# Patient Record
Sex: MALE | Race: WHITE | NOT HISPANIC OR LATINO | Employment: UNEMPLOYED | URBAN - NONMETROPOLITAN AREA
[De-identification: names, ages, dates, MRNs, and addresses within clinical notes are randomized per-mention and may not be internally consistent; named-entity substitution may affect disease eponyms.]

---

## 2021-07-16 ENCOUNTER — APPOINTMENT (OUTPATIENT)
Dept: GENERAL RADIOLOGY | Facility: HOSPITAL | Age: 31
End: 2021-07-16

## 2021-07-16 ENCOUNTER — HOSPITAL ENCOUNTER (EMERGENCY)
Facility: HOSPITAL | Age: 31
Discharge: HOME | End: 2021-07-16
Attending: EMERGENCY MEDICINE | Admitting: EMERGENCY MEDICINE

## 2021-07-16 ENCOUNTER — APPOINTMENT (OUTPATIENT)
Dept: CT IMAGING | Facility: HOSPITAL | Age: 31
End: 2021-07-16

## 2021-07-16 VITALS
DIASTOLIC BLOOD PRESSURE: 95 MMHG | RESPIRATION RATE: 18 BRPM | BODY MASS INDEX: 24.62 KG/M2 | TEMPERATURE: 97.9 F | HEIGHT: 75 IN | HEART RATE: 80 BPM | OXYGEN SATURATION: 99 % | WEIGHT: 198 LBS | SYSTOLIC BLOOD PRESSURE: 142 MMHG

## 2021-07-16 DIAGNOSIS — I10 HYPERTENSION, UNSPECIFIED TYPE: Primary | ICD-10-CM

## 2021-07-16 LAB
ALBUMIN SERPL-MCNC: 4.44 G/DL (ref 3.5–5.2)
ALBUMIN/GLOB SERPL: 1.6 G/DL
ALP SERPL-CCNC: 110 U/L (ref 39–117)
ALT SERPL W P-5'-P-CCNC: 213 U/L (ref 1–41)
ANION GAP SERPL CALCULATED.3IONS-SCNC: 8.6 MMOL/L (ref 5–15)
AST SERPL-CCNC: 87 U/L (ref 1–40)
BASOPHILS # BLD AUTO: 0.05 10*3/MM3 (ref 0–0.2)
BASOPHILS NFR BLD AUTO: 0.7 % (ref 0–1.5)
BILIRUB SERPL-MCNC: 0.3 MG/DL (ref 0–1.2)
BILIRUB UR QL STRIP: NEGATIVE
BUN SERPL-MCNC: 8 MG/DL (ref 6–20)
BUN/CREAT SERPL: 8.8 (ref 7–25)
CALCIUM SPEC-SCNC: 9.6 MG/DL (ref 8.6–10.5)
CHLORIDE SERPL-SCNC: 102 MMOL/L (ref 98–107)
CLARITY UR: CLEAR
CO2 SERPL-SCNC: 30.4 MMOL/L (ref 22–29)
COLOR UR: YELLOW
CREAT SERPL-MCNC: 0.91 MG/DL (ref 0.76–1.27)
DEPRECATED RDW RBC AUTO: 43.9 FL (ref 37–54)
EOSINOPHIL # BLD AUTO: 0.17 10*3/MM3 (ref 0–0.4)
EOSINOPHIL NFR BLD AUTO: 2.2 % (ref 0.3–6.2)
ERYTHROCYTE [DISTWIDTH] IN BLOOD BY AUTOMATED COUNT: 12.7 % (ref 12.3–15.4)
GFR SERPL CREATININE-BSD FRML MDRD: 99 ML/MIN/1.73
GLOBULIN UR ELPH-MCNC: 2.8 GM/DL
GLUCOSE SERPL-MCNC: 104 MG/DL (ref 65–99)
GLUCOSE UR STRIP-MCNC: NEGATIVE MG/DL
HCT VFR BLD AUTO: 47.8 % (ref 37.5–51)
HGB BLD-MCNC: 15.8 G/DL (ref 13–17.7)
HGB UR QL STRIP.AUTO: NEGATIVE
HOLD SPECIMEN: NORMAL
HOLD SPECIMEN: NORMAL
IMM GRANULOCYTES # BLD AUTO: 0.05 10*3/MM3 (ref 0–0.05)
IMM GRANULOCYTES NFR BLD AUTO: 0.7 % (ref 0–0.5)
KETONES UR QL STRIP: NEGATIVE
LEUKOCYTE ESTERASE UR QL STRIP.AUTO: NEGATIVE
LYMPHOCYTES # BLD AUTO: 1.94 10*3/MM3 (ref 0.7–3.1)
LYMPHOCYTES NFR BLD AUTO: 25.5 % (ref 19.6–45.3)
MAGNESIUM SERPL-MCNC: 1.9 MG/DL (ref 1.6–2.6)
MCH RBC QN AUTO: 30.9 PG (ref 26.6–33)
MCHC RBC AUTO-ENTMCNC: 33.1 G/DL (ref 31.5–35.7)
MCV RBC AUTO: 93.4 FL (ref 79–97)
MONOCYTES # BLD AUTO: 0.88 10*3/MM3 (ref 0.1–0.9)
MONOCYTES NFR BLD AUTO: 11.6 % (ref 5–12)
NEUTROPHILS NFR BLD AUTO: 4.51 10*3/MM3 (ref 1.7–7)
NEUTROPHILS NFR BLD AUTO: 59.3 % (ref 42.7–76)
NITRITE UR QL STRIP: NEGATIVE
NRBC BLD AUTO-RTO: 0 /100 WBC (ref 0–0.2)
PH UR STRIP.AUTO: 7.5 [PH] (ref 5–8)
PLATELET # BLD AUTO: 162 10*3/MM3 (ref 140–450)
PMV BLD AUTO: 11.2 FL (ref 6–12)
POTASSIUM SERPL-SCNC: 4.3 MMOL/L (ref 3.5–5.2)
PROT SERPL-MCNC: 7.2 G/DL (ref 6–8.5)
PROT UR QL STRIP: NEGATIVE
RBC # BLD AUTO: 5.12 10*6/MM3 (ref 4.14–5.8)
SODIUM SERPL-SCNC: 141 MMOL/L (ref 136–145)
SP GR UR STRIP: 1.01 (ref 1–1.03)
TROPONIN T SERPL-MCNC: <0.01 NG/ML (ref 0–0.03)
TSH SERPL DL<=0.05 MIU/L-ACNC: 0.87 UIU/ML (ref 0.27–4.2)
UROBILINOGEN UR QL STRIP: NORMAL
WBC # BLD AUTO: 7.6 10*3/MM3 (ref 3.4–10.8)
WHOLE BLOOD HOLD SPECIMEN: NORMAL

## 2021-07-16 PROCEDURE — 70450 CT HEAD/BRAIN W/O DYE: CPT

## 2021-07-16 PROCEDURE — 81003 URINALYSIS AUTO W/O SCOPE: CPT | Performed by: PHYSICIAN ASSISTANT

## 2021-07-16 PROCEDURE — 93005 ELECTROCARDIOGRAM TRACING: CPT | Performed by: PHYSICIAN ASSISTANT

## 2021-07-16 PROCEDURE — 71045 X-RAY EXAM CHEST 1 VIEW: CPT

## 2021-07-16 PROCEDURE — 84484 ASSAY OF TROPONIN QUANT: CPT | Performed by: PHYSICIAN ASSISTANT

## 2021-07-16 PROCEDURE — 93010 ELECTROCARDIOGRAM REPORT: CPT | Performed by: SPECIALIST

## 2021-07-16 PROCEDURE — 99283 EMERGENCY DEPT VISIT LOW MDM: CPT

## 2021-07-16 PROCEDURE — 71045 X-RAY EXAM CHEST 1 VIEW: CPT | Performed by: RADIOLOGY

## 2021-07-16 PROCEDURE — 84443 ASSAY THYROID STIM HORMONE: CPT | Performed by: PHYSICIAN ASSISTANT

## 2021-07-16 PROCEDURE — 80053 COMPREHEN METABOLIC PANEL: CPT | Performed by: PHYSICIAN ASSISTANT

## 2021-07-16 PROCEDURE — 85025 COMPLETE CBC W/AUTO DIFF WBC: CPT | Performed by: PHYSICIAN ASSISTANT

## 2021-07-16 PROCEDURE — 70450 CT HEAD/BRAIN W/O DYE: CPT | Performed by: RADIOLOGY

## 2021-07-16 PROCEDURE — 83735 ASSAY OF MAGNESIUM: CPT | Performed by: PHYSICIAN ASSISTANT

## 2021-07-16 RX ORDER — SODIUM CHLORIDE 0.9 % (FLUSH) 0.9 %
10 SYRINGE (ML) INJECTION AS NEEDED
Status: DISCONTINUED | OUTPATIENT
Start: 2021-07-16 | End: 2021-07-16 | Stop reason: HOSPADM

## 2021-07-16 RX ORDER — HYDROCHLOROTHIAZIDE 12.5 MG/1
12.5 TABLET ORAL DAILY
Qty: 30 TABLET | Refills: 0 | Status: SHIPPED | OUTPATIENT
Start: 2021-07-16

## 2021-07-16 RX ORDER — CLONIDINE HYDROCHLORIDE 0.1 MG/1
0.1 TABLET ORAL ONCE
Status: COMPLETED | OUTPATIENT
Start: 2021-07-16 | End: 2021-07-16

## 2021-07-16 RX ADMIN — CLONIDINE HYDROCHLORIDE 0.1 MG: 0.1 TABLET ORAL at 14:31

## 2021-07-16 NOTE — ED PROVIDER NOTES
Subjective   30-year-old male who presents to the ED today for elevated blood pressure.  He states he has noticed that his blood pressure has been elevated for the last few days.  He states he is not currently on any blood pressure medication and he does not have a primary care provider.  He states he was diagnosed with hypertension while he was in nursing home.  He states that plan to start him on medicine for this but then he was released.  He states today his blood pressure was 152/108.  He denies any chest pain.  He states he does feel mildly short of breath and has a headache.  He denies any change in his vision.  He denies any nausea or vomiting.  He denies any urinary symptoms.  He states that he does drink a lot of caffeine.  He states he has also been under a lot of stress recently.  He does smoke half a pack of cigarettes per day.      History provided by:  Patient  Hypertension  Severity:  Moderate  Onset quality:  Gradual  Duration: a few days.  Timing:  Constant  Progression:  Worsening  Chronicity:  New  Notable PTA blood pressures:  152/108  Context: caffeine and stress    Context: not drug abuse    Relieved by:  Nothing  Worsened by:  Nothing  Associated symptoms: anxiety, headaches and shortness of breath    Associated symptoms: no abdominal pain, no blurred vision, no chest pain, no confusion, no dizziness, no ear pain, no epistaxis, no fatigue, no fever, no hematuria, no nausea, no neck pain, no palpitations, no peripheral edema, no syncope, no tinnitus, not vomiting and no weakness    Risk factors: tobacco use    Risk factors: no alcohol use, no cocaine use, no decongestant use, no diabetes and no obesity        Review of Systems   Constitutional: Negative.  Negative for fatigue and fever.   HENT: Negative for ear pain, nosebleeds and tinnitus.    Eyes: Negative.  Negative for blurred vision and visual disturbance.   Respiratory: Positive for shortness of breath. Negative for chest tightness.     Cardiovascular: Negative for chest pain, palpitations, leg swelling and syncope.   Gastrointestinal: Negative for abdominal pain, nausea and vomiting.   Genitourinary: Negative.  Negative for hematuria.   Musculoskeletal: Negative for neck pain.   Skin: Negative.    Neurological: Positive for headaches. Negative for dizziness and weakness.   Psychiatric/Behavioral: Negative for confusion. The patient is nervous/anxious.    All other systems reviewed and are negative.      Past Medical History:   Diagnosis Date   • Hypertension        Allergies   Allergen Reactions   • Suprax [Cefixime] Anaphylaxis       Past Surgical History:   Procedure Laterality Date   • BACK SURGERY     • CHEST TUBE INSERTION     • HAND SURGERY     • TONSILLECTOMY         No family history on file.    Social History     Socioeconomic History   • Marital status: Single     Spouse name: Not on file   • Number of children: Not on file   • Years of education: Not on file   • Highest education level: Not on file   Tobacco Use   • Smoking status: Current Every Day Smoker     Packs/day: 0.50     Types: Cigarettes   Substance and Sexual Activity   • Alcohol use: No   • Drug use: No   • Sexual activity: Defer           Objective   Physical Exam  Vitals and nursing note reviewed.   Constitutional:       General: He is not in acute distress.     Appearance: Normal appearance. He is normal weight. He is not diaphoretic.   HENT:      Head: Normocephalic and atraumatic.      Right Ear: External ear normal.      Left Ear: External ear normal.   Eyes:      Conjunctiva/sclera: Conjunctivae normal.      Pupils: Pupils are equal, round, and reactive to light.   Cardiovascular:      Rate and Rhythm: Normal rate and regular rhythm.      Pulses: Normal pulses.      Heart sounds: Normal heart sounds.   Pulmonary:      Effort: Pulmonary effort is normal.      Breath sounds: Normal breath sounds.   Chest:      Chest wall: No tenderness.   Abdominal:      General:  Bowel sounds are normal.      Palpations: Abdomen is soft.      Tenderness: There is no abdominal tenderness. There is no guarding or rebound.   Musculoskeletal:         General: Normal range of motion.      Cervical back: Normal range of motion and neck supple. No tenderness.      Right lower leg: No edema.      Left lower leg: No edema.   Lymphadenopathy:      Cervical: No cervical adenopathy.   Skin:     General: Skin is warm and dry.      Capillary Refill: Capillary refill takes less than 2 seconds.   Neurological:      General: No focal deficit present.      Mental Status: He is alert and oriented to person, place, and time.   Psychiatric:         Mood and Affect: Mood normal.         Procedures           ED Course  ED Course as of Jul 16 1644 Fri Jul 16, 2021   2011 FINDINGS:    Brain:  Unremarkable.  No hemorrhage.  No significant white matter  disease.  No edema.    Ventricles:  Unremarkable.  No ventriculomegaly.    Bones/joints:  Unremarkable.  No acute fracture.    Soft tissues:  Unremarkable.    Sinuses:  Unremarkable as visualized.  No acute sinusitis.    Mastoid air cells:  Unremarkable as visualized.  No mastoid effusion.     IMPRESSION:    Unremarkable exam demonstrating no CT evidence of acute intracranial  findings.      CT Head Without Contrast [AH]   7541 FINDINGS:    Lungs:  Unremarkable.  No consolidation.    Pleural space:  Unremarkable.  No pneumothorax.    Heart:  Unremarkable.  No cardiomegaly.    Mediastinum:  Unremarkable.    Bones/joints:  Unremarkable.     IMPRESSION:    Unremarkable exam. No acute cardiopulmonary findings identified.   XR Chest 1 View [AH]   1596 Patient's blood pressure improved and he reports he is feeling better.  He will be discharged home on hydrochlorothiazide 12.5 mg/day.  He was advised on the importance of becoming established with a primary care provider and to monitor his blood pressure at home.  He will return to the ED if his symptoms change or worsen.     [AH]      ED Course User Index  [AH] Rebeca Hunter PA                                           MDM  Number of Diagnoses or Management Options     Amount and/or Complexity of Data Reviewed  Clinical lab tests: reviewed  Tests in the radiology section of CPT®: reviewed  Tests in the medicine section of CPT®: reviewed    Patient Progress  Patient progress: improved      Final diagnoses:   Hypertension, unspecified type       ED Disposition  ED Disposition     ED Disposition Condition Comment    Discharge Stable           PATIENT CONNECTION - [place]  See Provider List  [facility] [address]  [phone]  Schedule an appointment as soon as possible for a visit in 1 week           Medication List      New Prescriptions    hydroCHLOROthiazide 12.5 MG tablet  Commonly known as: HYDRODIURIL  Take 1 tablet by mouth Daily.           Where to Get Your Medications      These medications were sent to [facility] - [address] [address]    Hours: 8AM-6PM Mon-Fri Phone: [phone]   · hydroCHLOROthiazide 12.5 MG tablet          Rebeca Hunter PA  07/16/21 8201

## 2021-07-16 NOTE — DISCHARGE INSTRUCTIONS
Call the number below to become established with a primary care provider.  Follow your blood pressures at home and keep a log to show to your primary care provider.

## 2021-07-17 LAB
QT INTERVAL: 364 MS
QTC INTERVAL: 440 MS

## 2021-09-09 ENCOUNTER — TRANSCRIBE ORDERS (OUTPATIENT)
Dept: ADMINISTRATIVE | Facility: HOSPITAL | Age: 31
End: 2021-09-09

## 2021-09-09 ENCOUNTER — HOSPITAL ENCOUNTER (OUTPATIENT)
Dept: GENERAL RADIOLOGY | Facility: HOSPITAL | Age: 31
Discharge: HOME | End: 2021-09-09

## 2021-09-09 DIAGNOSIS — R10.9 ABDOMINAL PAIN, UNSPECIFIED ABDOMINAL LOCATION: ICD-10-CM

## 2021-09-09 DIAGNOSIS — R10.9 ABDOMINAL PAIN, UNSPECIFIED ABDOMINAL LOCATION: Primary | ICD-10-CM

## 2021-09-09 PROCEDURE — 74018 RADEX ABDOMEN 1 VIEW: CPT | Performed by: RADIOLOGY

## 2021-09-09 PROCEDURE — 74018 RADEX ABDOMEN 1 VIEW: CPT

## 2021-09-22 ENCOUNTER — DISEASE STATE MANAGEMENT VISIT (OUTPATIENT)
Dept: PHARMACY | Facility: HOSPITAL | Age: 31
End: 2021-09-22

## 2021-09-22 VITALS
SYSTOLIC BLOOD PRESSURE: 134 MMHG | DIASTOLIC BLOOD PRESSURE: 85 MMHG | WEIGHT: 224 LBS | HEIGHT: 75 IN | HEART RATE: 84 BPM | BODY MASS INDEX: 27.85 KG/M2

## 2021-09-22 DIAGNOSIS — B18.2 CHRONIC HEPATITIS C WITHOUT HEPATIC COMA (HCC): Primary | ICD-10-CM

## 2021-09-22 LAB
AMPHET+METHAMPHET UR QL: NEGATIVE
AMPHETAMINES UR QL: NEGATIVE
BARBITURATES UR QL SCN: NEGATIVE
BENZODIAZ UR QL SCN: NEGATIVE
BUPRENORPHINE SERPL-MCNC: NEGATIVE NG/ML
CANNABINOIDS SERPL QL: NEGATIVE
COCAINE UR QL: NEGATIVE
INR PPP: 0.92 (ref 0.9–1.1)
METHADONE UR QL SCN: NEGATIVE
OPIATES UR QL: NEGATIVE
OXYCODONE UR QL SCN: NEGATIVE
PCP UR QL SCN: NEGATIVE
PROPOXYPH UR QL: NEGATIVE
PROTHROMBIN TIME: 12.7 SECONDS (ref 12.8–14.5)
TRICYCLICS UR QL SCN: NEGATIVE

## 2021-09-22 PROCEDURE — 85025 COMPLETE CBC W/AUTO DIFF WBC: CPT

## 2021-09-22 PROCEDURE — 86704 HEP B CORE ANTIBODY TOTAL: CPT

## 2021-09-22 PROCEDURE — 85610 PROTHROMBIN TIME: CPT

## 2021-09-22 PROCEDURE — 80306 DRUG TEST PRSMV INSTRMNT: CPT

## 2021-09-22 PROCEDURE — 87522 HEPATITIS C REVRS TRNSCRPJ: CPT

## 2021-09-22 PROCEDURE — 99204 OFFICE O/P NEW MOD 45 MIN: CPT | Performed by: PHYSICIAN ASSISTANT

## 2021-09-22 PROCEDURE — 86708 HEPATITIS A ANTIBODY: CPT

## 2021-09-22 PROCEDURE — G0432 EIA HIV-1/HIV-2 SCREEN: HCPCS

## 2021-09-22 PROCEDURE — 82105 ALPHA-FETOPROTEIN SERUM: CPT

## 2021-09-22 PROCEDURE — 86706 HEP B SURFACE ANTIBODY: CPT

## 2021-09-22 PROCEDURE — 87340 HEPATITIS B SURFACE AG IA: CPT

## 2021-09-22 PROCEDURE — 81596 NFCT DS CHRNC HCV 6 ASSAYS: CPT

## 2021-09-22 PROCEDURE — 80053 COMPREHEN METABOLIC PANEL: CPT

## 2021-09-22 PROCEDURE — 87902 NFCT AGT GNTYP ALYS HEP C: CPT

## 2021-09-22 RX ORDER — PHENOL 1.4 %
1 AEROSOL, SPRAY (ML) MUCOUS MEMBRANE NIGHTLY
COMMUNITY

## 2021-09-22 RX ORDER — VENLAFAXINE 75 MG/1
75 TABLET ORAL DAILY
COMMUNITY

## 2021-09-22 RX ORDER — TRAZODONE HYDROCHLORIDE 50 MG/1
50 TABLET ORAL NIGHTLY
COMMUNITY

## 2021-09-22 NOTE — PROGRESS NOTES
Chief Complaint   Patient presents with   • Hepatitis C     Darian Hoffman is a 30 y.o. male who presents to the office today at the request of Self Referring for Hepatitis C.    HPI  He found out about having Hepatitis C infection approx 2 years ago. He has not had prior treatment for hepatitis. Reports no known personal history of liver disease including other viral hepatitis. There is no known family history of liver disease or cirrhosis. He admits to previous IVDU and intranasal drug use. He does have nonprofessional tattoos. Denies having previous alcoholism. He does not currently drink alcohol. He denies current illicit drug use including marijuana. No recent liver imaging. He has had recent general labs. He has had previous vaccinations for Hepatitis A but not Hepatitis B.   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 The patient receives support from his father and mother.      Review of Systems   Constitutional: Negative for activity change, appetite change and fatigue.   HENT: Negative for trouble swallowing and voice change.    Respiratory: Negative for cough and choking.    Cardiovascular: Negative for leg swelling.   Gastrointestinal: Negative for abdominal distention, abdominal pain, anal bleeding, blood in stool, constipation, diarrhea, nausea, rectal pain and vomiting.   Endocrine: Negative for polyphagia.   Genitourinary: Negative for flank pain.   Musculoskeletal: Positive for back pain.   Skin: Negative for color change and pallor.   Allergic/Immunologic: Negative for food allergies.   Neurological: Negative for weakness.   Psychiatric/Behavioral: Negative for confusion.       ACTIVE PROBLEMS:   Specialty Problems     None          PAST MEDICAL HISTORY:  Past Medical History:   Diagnosis Date   • Hypertension        SURGICAL HISTORY:  Past Surgical History:   Procedure Laterality Date   • BACK SURGERY     • CHEST TUBE INSERTION     • HAND SURGERY     • TONSILLECTOMY    "      FAMILY HISTORY:  History reviewed. No pertinent family history.    SOCIAL HISTORY:  Social History     Tobacco Use   • Smoking status: Former Smoker     Packs/day: 0.00     Types: Cigarettes   • Smokeless tobacco: Current User     Types: Snuff   Substance Use Topics   • Alcohol use: No       CURRENT MEDICATION:    Current Outpatient Medications:   •  hydroCHLOROthiazide (HYDRODIURIL) 12.5 MG tablet, Take 1 tablet by mouth Daily., Disp: 30 tablet, Rfl: 0  •  Melatonin 10 MG tablet, Take 1 tablet by mouth Every Night., Disp: , Rfl:   •  traZODone (DESYREL) 50 MG tablet, Take 50 mg by mouth Every Night., Disp: , Rfl:   •  venlafaxine (EFFEXOR) 75 MG tablet, Take 75 mg by mouth Daily., Disp: , Rfl:     ALLERGIES:  Suprax [cefixime]    VISIT VITALS:  Blood Pressure 134/85   Pulse 84   Height 190.5 cm (75\")   Weight 102 kg (224 lb)   Body Mass Index 28.00 kg/m²     PHYSICAL EXAMINATION:  Physical Exam  Constitutional:       General: He is not in acute distress.     Appearance: He is well-developed. He is not diaphoretic.   HENT:      Head: Normocephalic and atraumatic.      Right Ear: External ear normal.      Left Ear: External ear normal.      Nose: Nose normal.      Mouth/Throat:      Pharynx: No oropharyngeal exudate.   Eyes:      General: No scleral icterus.        Right eye: No discharge.         Left eye: No discharge.      Conjunctiva/sclera: Conjunctivae normal.      Pupils: Pupils are equal, round, and reactive to light.   Neck:      Thyroid: No thyromegaly.      Vascular: No JVD.      Trachea: No tracheal deviation.   Cardiovascular:      Rate and Rhythm: Normal rate and regular rhythm.      Heart sounds: Normal heart sounds. No murmur heard.   No friction rub. No gallop.    Pulmonary:      Effort: Pulmonary effort is normal. No respiratory distress.      Breath sounds: Normal breath sounds. No stridor. No wheezing or rales.   Chest:      Chest wall: No tenderness.   Abdominal:      General: Bowel " sounds are normal. There is no distension.      Palpations: Abdomen is soft. There is no mass.      Tenderness: There is abdominal tenderness (epigastric). There is no guarding or rebound.      Hernia: No hernia is present.   Genitourinary:     Rectum: Guaiac result negative.   Musculoskeletal:      Cervical back: Normal range of motion and neck supple.   Lymphadenopathy:      Cervical: No cervical adenopathy.   Skin:     General: Skin is warm and dry.      Coloration: Skin is not pale.      Findings: No erythema or rash.      Comments: Diffuse tattooing on UEs   Neurological:      Mental Status: He is alert and oriented to person, place, and time.      Cranial Nerves: No cranial nerve deficit.      Motor: No abnormal muscle tone.      Coordination: Coordination normal.      Deep Tendon Reflexes: Reflexes are normal and symmetric. Reflexes normal.   Psychiatric:         Behavior: Behavior normal.         Thought Content: Thought content normal.         Judgment: Judgment normal.         Assessment/Plan      Diagnosis Plan   1. Chronic hepatitis C without hepatic coma (HCC)  AFP Tumor Marker    CBC & Differential    Comprehensive Metabolic Panel    HCV FibroSURE    HCV RNA By PCR, Qn Rfx Nichelle    Hepatitis A Antibody, Total    US Liver    Urine Drug Screen - Urine, Clean Catch    Protime-INR    HIV-1 & HIV-2 Antibodies    Hepatitis B Surface Antigen    Hepatitis B Surface Antibody    Hepatitis B Core Antibody, Total     The patient will complete initial lab work and liver US in order to begin Hepatitis C treatment.  The patient will return in two weeks to discuss results and begin treatment if warranted.  Return in about 4 weeks (around 10/20/2021) for Recheck.           MARCELO Stark

## 2021-09-23 LAB
ALBUMIN SERPL-MCNC: 4.8 G/DL (ref 3.5–5.2)
ALBUMIN/GLOB SERPL: 1.8 G/DL
ALP SERPL-CCNC: 94 U/L (ref 39–117)
ALPHA-FETOPROTEIN: 2.65 NG/ML (ref 0–8.3)
ALT SERPL W P-5'-P-CCNC: 190 U/L (ref 1–41)
ANION GAP SERPL CALCULATED.3IONS-SCNC: 11.4 MMOL/L (ref 5–15)
AST SERPL-CCNC: 100 U/L (ref 1–40)
BASOPHILS # BLD AUTO: 0.05 10*3/MM3 (ref 0–0.2)
BASOPHILS NFR BLD AUTO: 0.5 % (ref 0–1.5)
BILIRUB SERPL-MCNC: 0.5 MG/DL (ref 0–1.2)
BUN SERPL-MCNC: 16 MG/DL (ref 6–20)
BUN/CREAT SERPL: 16 (ref 7–25)
CALCIUM SPEC-SCNC: 9.8 MG/DL (ref 8.6–10.5)
CHLORIDE SERPL-SCNC: 100 MMOL/L (ref 98–107)
CO2 SERPL-SCNC: 27.6 MMOL/L (ref 22–29)
CREAT SERPL-MCNC: 1 MG/DL (ref 0.76–1.27)
DEPRECATED RDW RBC AUTO: 43 FL (ref 37–54)
EOSINOPHIL # BLD AUTO: 0.17 10*3/MM3 (ref 0–0.4)
EOSINOPHIL NFR BLD AUTO: 1.8 % (ref 0.3–6.2)
ERYTHROCYTE [DISTWIDTH] IN BLOOD BY AUTOMATED COUNT: 12.3 % (ref 12.3–15.4)
GFR SERPL CREATININE-BSD FRML MDRD: 88 ML/MIN/1.73
GLOBULIN UR ELPH-MCNC: 2.7 GM/DL
GLUCOSE SERPL-MCNC: 95 MG/DL (ref 65–99)
HAV AB SER QL: POSITIVE
HBV CORE AB SERPL QL IA: NEGATIVE
HBV SURFACE AB SER RIA-ACNC: NORMAL
HBV SURFACE AG SERPL QL IA: NORMAL
HCT VFR BLD AUTO: 48.6 % (ref 37.5–51)
HGB BLD-MCNC: 16.1 G/DL (ref 13–17.7)
HIV1+2 AB SER QL: NORMAL
IMM GRANULOCYTES # BLD AUTO: 0.04 10*3/MM3 (ref 0–0.05)
IMM GRANULOCYTES NFR BLD AUTO: 0.4 % (ref 0–0.5)
LYMPHOCYTES # BLD AUTO: 2.06 10*3/MM3 (ref 0.7–3.1)
LYMPHOCYTES NFR BLD AUTO: 21.8 % (ref 19.6–45.3)
MCH RBC QN AUTO: 30.7 PG (ref 26.6–33)
MCHC RBC AUTO-ENTMCNC: 33.1 G/DL (ref 31.5–35.7)
MCV RBC AUTO: 92.7 FL (ref 79–97)
MONOCYTES # BLD AUTO: 0.8 10*3/MM3 (ref 0.1–0.9)
MONOCYTES NFR BLD AUTO: 8.5 % (ref 5–12)
NEUTROPHILS NFR BLD AUTO: 6.34 10*3/MM3 (ref 1.7–7)
NEUTROPHILS NFR BLD AUTO: 67 % (ref 42.7–76)
NRBC BLD AUTO-RTO: 0 /100 WBC (ref 0–0.2)
PLATELET # BLD AUTO: 146 10*3/MM3 (ref 140–450)
PMV BLD AUTO: 12.3 FL (ref 6–12)
POTASSIUM SERPL-SCNC: 3.9 MMOL/L (ref 3.5–5.2)
PROT SERPL-MCNC: 7.5 G/DL (ref 6–8.5)
RBC # BLD AUTO: 5.24 10*6/MM3 (ref 4.14–5.8)
SODIUM SERPL-SCNC: 139 MMOL/L (ref 136–145)
WBC # BLD AUTO: 9.46 10*3/MM3 (ref 3.4–10.8)

## 2021-09-24 LAB
HCV GENTYP SERPL NAA+PROBE: 3
HCV GENTYP SERPL NAA+PROBE: NORMAL
HCV RNA SERPL NAA+PROBE-ACNC: NORMAL IU/ML
HCV RNA SERPL NAA+PROBE-ACNC: NORMAL IU/ML
HCV RNA SERPL NAA+PROBE-LOG IU: 7.2 LOG10 IU/ML
LABORATORY COMMENT REPORT: NORMAL
REF LAB TEST REF RANGE: NORMAL

## 2021-09-25 LAB
A2 MACROGLOB SERPL-MCNC: 156 MG/DL (ref 110–276)
ALT SERPL W P-5'-P-CCNC: 215 IU/L (ref 0–55)
APO A-I SERPL-MCNC: 104 MG/DL (ref 101–178)
BILIRUB SERPL-MCNC: 0.5 MG/DL (ref 0–1.2)
FIBROSIS SCORING:: ABNORMAL
FIBROSIS STAGE SERPL QL: ABNORMAL
GGT SERPL-CCNC: 36 IU/L (ref 0–65)
HAPTOGLOB SERPL-MCNC: 16 MG/DL (ref 17–317)
HCV AB SER QL: ABNORMAL
LABORATORY COMMENT REPORT: ABNORMAL
LIVER FIBR SCORE SERPL CALC.FIBROSURE: 0.38 (ref 0–0.21)
NECROINFLAMM ACTIVITY SCORING:: ABNORMAL
NECROINFLAMMATORY ACT GRADE SERPL QL: ABNORMAL
NECROINFLAMMATORY ACT SCORE SERPL: 0.84 (ref 0–0.17)
SERVICE CMNT-IMP: ABNORMAL

## 2021-10-06 ENCOUNTER — DISEASE STATE MANAGEMENT VISIT (OUTPATIENT)
Dept: PHARMACY | Facility: HOSPITAL | Age: 31
End: 2021-10-06

## 2021-10-06 VITALS
DIASTOLIC BLOOD PRESSURE: 90 MMHG | WEIGHT: 220 LBS | HEIGHT: 74 IN | HEART RATE: 83 BPM | SYSTOLIC BLOOD PRESSURE: 138 MMHG | BODY MASS INDEX: 28.23 KG/M2

## 2021-10-06 DIAGNOSIS — B18.2 CHRONIC HEPATITIS C WITHOUT HEPATIC COMA (HCC): Primary | ICD-10-CM

## 2021-10-06 PROCEDURE — 99214 OFFICE O/P EST MOD 30 MIN: CPT | Performed by: PHYSICIAN ASSISTANT

## 2021-10-06 RX ORDER — GLECAPREVIR AND PIBRENTASVIR 40; 100 MG/1; MG/1
3 TABLET, FILM COATED ORAL DAILY
Qty: 84 TABLET | Refills: 1 | Status: SHIPPED | OUTPATIENT
Start: 2021-10-06

## 2021-10-06 RX ORDER — GLECAPREVIR AND PIBRENTASVIR 40; 100 MG/1; MG/1
3 TABLET, FILM COATED ORAL DAILY
Qty: 84 TABLET | Refills: 1
Start: 2021-10-06 | End: 2021-10-06 | Stop reason: SDUPTHER

## 2021-10-06 NOTE — PROGRESS NOTES
Darian Hoffman is a 30 y.o. male seen [facility] for Hepatitis C treatment.      Past Medical History:   Diagnosis Date   • Hypertension      Social History     Socioeconomic History   • Marital status: Single     Spouse name: Not on file   • Number of children: Not on file   • Years of education: Not on file   • Highest education level: Not on file   Tobacco Use   • Smoking status: Former Smoker     Packs/day: 0.00     Types: Cigarettes   • Smokeless tobacco: Current User     Types: Snuff   Vaping Use   • Vaping Use: Former   • Substances: Nicotine   • Devices: Disposable   Substance and Sexual Activity   • Alcohol use: No   • Drug use: Not Currently     Types: Methamphetamines, Marijuana     Comment: Clean for 3 months   • Sexual activity: Not Currently     Partners: Female     Suprax [cefixime]    Current Outpatient Medications:   •  Glecaprevir-Pibrentasvir (Mavyret) 100-40 MG tablet, Take 3 tablets by mouth Daily., Disp: 84 tablet, Rfl: 1  •  hydroCHLOROthiazide (HYDRODIURIL) 12.5 MG tablet, Take 1 tablet by mouth Daily., Disp: 30 tablet, Rfl: 0  •  Melatonin 10 MG tablet, Take 1 tablet by mouth Every Night., Disp: , Rfl:   •  traZODone (DESYREL) 50 MG tablet, Take 50 mg by mouth Every Night., Disp: , Rfl:   •  venlafaxine (EFFEXOR) 75 MG tablet, Take 75 mg by mouth Daily., Disp: , Rfl:     Labs     WBC   Date Value Ref Range Status   09/22/2021 9.46 3.40 - 10.80 10*3/mm3 Final     RBC   Date Value Ref Range Status   09/22/2021 5.24 4.14 - 5.80 10*6/mm3 Final     Hemoglobin   Date Value Ref Range Status   09/22/2021 16.1 13.0 - 17.7 g/dL Final     Hematocrit   Date Value Ref Range Status   09/22/2021 48.6 37.5 - 51.0 % Final     MCV   Date Value Ref Range Status   09/22/2021 92.7 79.0 - 97.0 fL Final     MCH   Date Value Ref Range Status   09/22/2021 30.7 26.6 - 33.0 pg Final     MCHC   Date Value Ref Range Status   09/22/2021 33.1 31.5 - 35.7 g/dL Final     RDW   Date Value Ref  Range Status   09/22/2021 12.3 12.3 - 15.4 % Final     RDW-SD   Date Value Ref Range Status   09/22/2021 43.0 37.0 - 54.0 fl Final     MPV   Date Value Ref Range Status   09/22/2021 12.3 (H) 6.0 - 12.0 fL Final     Platelets   Date Value Ref Range Status   09/22/2021 146 140 - 450 10*3/mm3 Final     Neutrophil %   Date Value Ref Range Status   09/22/2021 67.0 42.7 - 76.0 % Final     Lymphocyte %   Date Value Ref Range Status   09/22/2021 21.8 19.6 - 45.3 % Final     Monocyte %   Date Value Ref Range Status   09/22/2021 8.5 5.0 - 12.0 % Final     Eosinophil %   Date Value Ref Range Status   09/22/2021 1.8 0.3 - 6.2 % Final     Basophil %   Date Value Ref Range Status   09/22/2021 0.5 0.0 - 1.5 % Final     Immature Grans %   Date Value Ref Range Status   09/22/2021 0.4 0.0 - 0.5 % Final     Neutrophils, Absolute   Date Value Ref Range Status   09/22/2021 6.34 1.70 - 7.00 10*3/mm3 Final     Lymphocytes, Absolute   Date Value Ref Range Status   09/22/2021 2.06 0.70 - 3.10 10*3/mm3 Final     Monocytes, Absolute   Date Value Ref Range Status   09/22/2021 0.80 0.10 - 0.90 10*3/mm3 Final     Eosinophils, Absolute   Date Value Ref Range Status   09/22/2021 0.17 0.00 - 0.40 10*3/mm3 Final     Basophils, Absolute   Date Value Ref Range Status   09/22/2021 0.05 0.00 - 0.20 10*3/mm3 Final     Immature Grans, Absolute   Date Value Ref Range Status   09/22/2021 0.04 0.00 - 0.05 10*3/mm3 Final     nRBC   Date Value Ref Range Status   09/22/2021 0.0 0.0 - 0.2 /100 WBC Final       Lab Results   Component Value Date    WBC 9.46 09/22/2021    HGB 16.1 09/22/2021    HCT 48.6 09/22/2021    MCV 92.7 09/22/2021     09/22/2021     Lab Results   Component Value Date    HAV Positive (A) 09/22/2021    HEPBCAB Negative 09/22/2021       No results found for: HCVRNA   Hepatitis C Genotype   Date Value Ref Range Status   09/22/2021 3  Final     HCV Genotype   Date Value Ref Range Status   09/22/2021 Comment  Final     Comment:     To be  performed on this specimen.       Drug Interactions Screening     A drug-drug interactions check was made. No significant interactions expected according to literature.     Assessment & Plan     Patient has Hepatitis C, genotype 3 (F1-F2)(FIB-4 =1.35) and is treatment naïve.  Patient has been prescribed Mavyret 3 tablets daily with food x 8 weeks.  Will begin prior authorization, if applicable and provide medication counseling to patient once drug is approved and ready to dispense.      Pt will follow up with clinic 4 weeks after starting medication to assess virologic response and medication tolerability.     Raquel Alvarez, PharmD  10/06/21  13:01 EDT

## 2021-10-06 NOTE — PROGRESS NOTES
Chief Complaint   Patient presents with   • Hepatitis C       Darian Hoffman is a 30 y.o. male who presents to the office today for follow up appointment for Hepatitis C  .    HPI    His viral load is 15,700,000 and genotype is 3.  Patient is negative for HIV and Hep B.  He has not antibodies to Hep B but does have them to Hep A.  PT/INR not concerning at 12.7.  Urine drug screen negative.  Fibrosis score is 0.38 which is stage F1-F2.  No cirrhosis.  ALT is 190 and AST is 100.  AFP marker negative.  He is tx naive.    Review of Systems   Constitutional: Positive for fatigue. Negative for activity change and appetite change.   HENT: Negative for trouble swallowing and voice change.    Respiratory: Negative for cough and choking.    Cardiovascular: Negative for leg swelling.   Gastrointestinal: Negative for abdominal distention, abdominal pain, anal bleeding, blood in stool, constipation, diarrhea, nausea, rectal pain and vomiting.   Endocrine: Negative for polyphagia.   Genitourinary: Negative for flank pain.   Musculoskeletal: Positive for neck pain. Negative for back pain.   Skin: Negative for color change and pallor.   Allergic/Immunologic: Negative for food allergies.   Neurological: Negative for weakness.   Psychiatric/Behavioral: Negative for confusion.       ACTIVE PROBLEMS:   Specialty Problems     None          PAST MEDICAL HISTORY:  Past Medical History:   Diagnosis Date   • Hypertension        SURGICAL HISTORY:  Past Surgical History:   Procedure Laterality Date   • BACK SURGERY     • CHEST TUBE INSERTION     • HAND SURGERY     • TONSILLECTOMY         FAMILY HISTORY:  History reviewed. No pertinent family history.    SOCIAL HISTORY:  Social History     Tobacco Use   • Smoking status: Former Smoker     Packs/day: 0.00     Types: Cigarettes   • Smokeless tobacco: Current User     Types: Snuff   Substance Use Topics   • Alcohol use: No       CURRENT MEDICATION:    Current Outpatient Medications:   •   "hydroCHLOROthiazide (HYDRODIURIL) 12.5 MG tablet, Take 1 tablet by mouth Daily., Disp: 30 tablet, Rfl: 0  •  Melatonin 10 MG tablet, Take 1 tablet by mouth Every Night., Disp: , Rfl:   •  traZODone (DESYREL) 50 MG tablet, Take 50 mg by mouth Every Night., Disp: , Rfl:   •  venlafaxine (EFFEXOR) 75 MG tablet, Take 75 mg by mouth Daily., Disp: , Rfl:   •  Glecaprevir-Pibrentasvir (Mavyret) 100-40 MG tablet, Take 3 tablets by mouth Daily., Disp: 84 tablet, Rfl: 1    ALLERGIES:  Suprax [cefixime]    VISIT VITALS:  Blood Pressure 138/90   Pulse 83   Height 188 cm (74\")   Weight 99.8 kg (220 lb)   Body Mass Index 28.25 kg/m²     Physical Exam  Constitutional:       General: He is not in acute distress.     Appearance: He is well-developed. He is not diaphoretic.   HENT:      Head: Normocephalic and atraumatic.      Right Ear: External ear normal.      Left Ear: External ear normal.      Nose: Nose normal.      Mouth/Throat:      Pharynx: No oropharyngeal exudate.   Eyes:      General: No scleral icterus.        Right eye: No discharge.         Left eye: No discharge.      Conjunctiva/sclera: Conjunctivae normal.      Pupils: Pupils are equal, round, and reactive to light.   Neck:      Thyroid: No thyromegaly.      Vascular: No JVD.      Trachea: No tracheal deviation.   Cardiovascular:      Rate and Rhythm: Normal rate and regular rhythm.      Heart sounds: Normal heart sounds. No murmur heard.   No friction rub. No gallop.    Pulmonary:      Effort: Pulmonary effort is normal. No respiratory distress.      Breath sounds: Normal breath sounds. No stridor. No wheezing or rales.   Chest:      Chest wall: No tenderness.   Abdominal:      General: Bowel sounds are normal. There is no distension.      Palpations: Abdomen is soft. There is no mass.      Tenderness: There is no abdominal tenderness. There is no guarding or rebound.      Hernia: No hernia is present.   Genitourinary:     Rectum: Guaiac result negative. "   Musculoskeletal:      Cervical back: Normal range of motion and neck supple.   Lymphadenopathy:      Cervical: No cervical adenopathy.   Skin:     General: Skin is warm and dry.      Coloration: Skin is not pale.      Findings: No erythema or rash.      Comments: Tattooing on extremities   Neurological:      Mental Status: He is alert and oriented to person, place, and time.      Cranial Nerves: No cranial nerve deficit.      Motor: No abnormal muscle tone.      Coordination: Coordination normal.      Deep Tendon Reflexes: Reflexes are normal and symmetric. Reflexes normal.   Psychiatric:         Behavior: Behavior normal.         Thought Content: Thought content normal.         Judgment: Judgment normal.         Assessment/Plan      Diagnosis Plan   1. Chronic hepatitis C without hepatic coma (HCC)  Comprehensive Metabolic Panel    Hepatitis C RNA, Quantitative, PCR (graph)     He will start Mavyret treatment.  He was educated on side effects and administration of medicine.  He will return in 4 weeks to have liver enzymes and viral load checked and to monitor side effects.    Return in about 4 weeks (around 11/3/2021) for Recheck.         MARCELO Stark

## 2021-10-07 ENCOUNTER — HOSPITAL ENCOUNTER (OUTPATIENT)
Dept: GENERAL RADIOLOGY | Facility: HOSPITAL | Age: 31
Discharge: HOME | End: 2021-10-07

## 2021-10-07 ENCOUNTER — HOSPITAL ENCOUNTER (OUTPATIENT)
Dept: ULTRASOUND IMAGING | Facility: HOSPITAL | Age: 31
Discharge: HOME | End: 2021-10-07

## 2021-10-07 ENCOUNTER — TRANSCRIBE ORDERS (OUTPATIENT)
Dept: ADMINISTRATIVE | Facility: HOSPITAL | Age: 31
End: 2021-10-07

## 2021-10-07 DIAGNOSIS — B18.2 CHRONIC HEPATITIS C WITHOUT HEPATIC COMA (HCC): ICD-10-CM

## 2021-10-07 DIAGNOSIS — R10.9 ABDOMINAL PAIN, UNSPECIFIED ABDOMINAL LOCATION: ICD-10-CM

## 2021-10-07 DIAGNOSIS — R10.9 ABDOMINAL PAIN, UNSPECIFIED ABDOMINAL LOCATION: Primary | ICD-10-CM

## 2021-10-07 PROCEDURE — 76705 ECHO EXAM OF ABDOMEN: CPT | Performed by: RADIOLOGY

## 2021-10-07 PROCEDURE — 74018 RADEX ABDOMEN 1 VIEW: CPT | Performed by: RADIOLOGY

## 2021-10-07 PROCEDURE — 76705 ECHO EXAM OF ABDOMEN: CPT

## 2021-10-07 PROCEDURE — 74018 RADEX ABDOMEN 1 VIEW: CPT

## 2021-10-07 NOTE — PROGRESS NOTES
Patient was counseled on new medication Mavyret (glecaprevir and pibrentasvir)     The patient was counseled on the following:     - Take 3 tablets at the same time each day, with food.   - This medication is used to treat hepatitis C infection.   - Frequently reported side effects of this drug include: headache, loss of energy, nausea and diarrhea.   - Talk to your doctor right away if you notice dark urine, fatigue, lack of appetite, nausea, abdominal pain, light-colored stools, vomiting or yellow skin.   - Go to the ED with signs of a significant reaction including wheezing; chest tightness; fever; itching; bad cough; blue skin color; seizures; or swelling of face, lips, tongue or     throat.   - Be sure to follow up with our clinic 4 weeks after starting the medication to ensure you are tolerating the medication well and that you are responding appropriately to the medication.   - Make sure to tell your doctor or pharmacist about all medications you are taking, including herbal supplements and OTC products.    - Do not stop taking this medication without talking to your provider.   - It is very important that you do not miss a dose of this medication.  Use a pill planner, medication adherence diana or other tool to help you remember how to take your medication.    - If you do miss a dose and it is within 18 hours from the usual dosage time, administer dose as soon as possible, then administer dose at usual dosage time.  If more than 18 hours from the usual dosage time has passed, skip the missed dose and administer the next dose at the usual time.       Patient Specific Counseling Points:     - Females of childbearing potential should consider postponing pregnancy until therapy is complete to reduce the risk of HCV transmission.   - This medication should not be used in pregnant females and it is not known if the medication is present in breast milk.     - Patients with diabetes should closely monitor their blood  sugar after starting. This medication may lead to an improvement in glucose metabolism, potentially resulting in hypoglycemia.  Monitor for signs and symptoms of hypoglycemia and follow the rule of 15 to treat hypoglycemia.       Veronica Neal [facility]

## 2021-10-12 ENCOUNTER — TRANSCRIBE ORDERS (OUTPATIENT)
Dept: ADMINISTRATIVE | Facility: HOSPITAL | Age: 31
End: 2021-10-12

## 2021-10-12 ENCOUNTER — HOSPITAL ENCOUNTER (OUTPATIENT)
Dept: GENERAL RADIOLOGY | Facility: HOSPITAL | Age: 31
Discharge: HOME | End: 2021-10-12

## 2021-10-12 DIAGNOSIS — M54.50 BILATERAL LOW BACK PAIN, UNSPECIFIED CHRONICITY, UNSPECIFIED WHETHER SCIATICA PRESENT: ICD-10-CM

## 2021-10-12 DIAGNOSIS — M54.50 BILATERAL LOW BACK PAIN, UNSPECIFIED CHRONICITY, UNSPECIFIED WHETHER SCIATICA PRESENT: Primary | ICD-10-CM

## 2021-10-12 PROCEDURE — 73523 X-RAY EXAM HIPS BI 5/> VIEWS: CPT

## 2021-10-12 PROCEDURE — 72100 X-RAY EXAM L-S SPINE 2/3 VWS: CPT

## 2021-10-12 PROCEDURE — 73523 X-RAY EXAM HIPS BI 5/> VIEWS: CPT | Performed by: RADIOLOGY

## 2021-10-12 PROCEDURE — 72100 X-RAY EXAM L-S SPINE 2/3 VWS: CPT | Performed by: RADIOLOGY

## 2021-10-14 ENCOUNTER — APPOINTMENT (OUTPATIENT)
Dept: ULTRASOUND IMAGING | Facility: HOSPITAL | Age: 31
End: 2021-10-14

## 2021-11-03 ENCOUNTER — DISEASE STATE MANAGEMENT VISIT (OUTPATIENT)
Dept: PHARMACY | Facility: HOSPITAL | Age: 31
End: 2021-11-03

## 2021-11-03 VITALS
HEART RATE: 84 BPM | HEIGHT: 75 IN | SYSTOLIC BLOOD PRESSURE: 136 MMHG | WEIGHT: 236 LBS | DIASTOLIC BLOOD PRESSURE: 83 MMHG | BODY MASS INDEX: 29.34 KG/M2

## 2021-11-03 DIAGNOSIS — B18.2 CHRONIC HEPATITIS C WITHOUT HEPATIC COMA (HCC): Primary | ICD-10-CM

## 2021-11-03 PROCEDURE — 87522 HEPATITIS C REVRS TRNSCRPJ: CPT

## 2021-11-03 PROCEDURE — 80053 COMPREHEN METABOLIC PANEL: CPT

## 2021-11-03 PROCEDURE — 99213 OFFICE O/P EST LOW 20 MIN: CPT | Performed by: PHYSICIAN ASSISTANT

## 2021-11-03 RX ORDER — DICLOFENAC SODIUM 75 MG/1
75 TABLET, DELAYED RELEASE ORAL 2 TIMES DAILY
COMMUNITY

## 2021-11-03 NOTE — PROGRESS NOTES
Chief Complaint   Patient presents with   • Hepatitis C       Darian Hoffman is a 30 y.o. male who presents to the office today for follow up appointment for Hepatitis C  .    HPI  The patient was seen today after being on Mavyret for 4 weeks.  He states that he has had headaches and fatigue but no nausea.  He has not missed any doses.        Review of Systems   Constitutional: Negative for activity change, appetite change and fatigue.   HENT: Negative for trouble swallowing and voice change.    Respiratory: Negative for cough and choking.    Cardiovascular: Negative for leg swelling.   Gastrointestinal: Negative for abdominal distention, abdominal pain, anal bleeding, blood in stool, constipation, diarrhea, nausea, rectal pain and vomiting.   Endocrine: Negative for polyphagia.   Genitourinary: Negative for flank pain.   Musculoskeletal: Positive for back pain.   Skin: Negative for color change and pallor.   Allergic/Immunologic: Negative for food allergies.   Neurological: Negative for weakness.   Psychiatric/Behavioral: Negative for confusion.       ACTIVE PROBLEMS:   Specialty Problems     None          PAST MEDICAL HISTORY:  Past Medical History:   Diagnosis Date   • Hypertension        SURGICAL HISTORY:  Past Surgical History:   Procedure Laterality Date   • BACK SURGERY     • CHEST TUBE INSERTION     • HAND SURGERY     • TONSILLECTOMY         FAMILY HISTORY:  History reviewed. No pertinent family history.    SOCIAL HISTORY:  Social History     Tobacco Use   • Smoking status: Former Smoker     Packs/day: 0.00     Types: Cigarettes   • Smokeless tobacco: Current User     Types: Snuff   Substance Use Topics   • Alcohol use: No       CURRENT MEDICATION:    Current Outpatient Medications:   •  diclofenac (VOLTAREN) 75 MG EC tablet, Take 75 mg by mouth 2 (Two) Times a Day., Disp: , Rfl:   •  Glecaprevir-Pibrentasvir (Mavyret) 100-40 MG tablet, Take 3 tablets by mouth Daily., Disp: 84 tablet, Rfl: 1  •   "hydroCHLOROthiazide (HYDRODIURIL) 12.5 MG tablet, Take 1 tablet by mouth Daily., Disp: 30 tablet, Rfl: 0  •  Melatonin 10 MG tablet, Take 1 tablet by mouth Every Night., Disp: , Rfl:   •  traZODone (DESYREL) 50 MG tablet, Take 50 mg by mouth Every Night., Disp: , Rfl:   •  venlafaxine (EFFEXOR) 75 MG tablet, Take 75 mg by mouth Daily., Disp: , Rfl:     ALLERGIES:  Suprax [cefixime]    VISIT VITALS:  Blood Pressure 136/83   Pulse 84   Height 190.5 cm (75\")   Weight 107 kg (236 lb)   Body Mass Index 29.50 kg/m²     Physical Exam  Constitutional:       General: He is not in acute distress.     Appearance: He is well-developed. He is not diaphoretic.   HENT:      Head: Normocephalic and atraumatic.      Right Ear: External ear normal.      Left Ear: External ear normal.      Nose: Nose normal.      Mouth/Throat:      Pharynx: No oropharyngeal exudate.   Eyes:      General: No scleral icterus.        Right eye: No discharge.         Left eye: No discharge.      Conjunctiva/sclera: Conjunctivae normal.      Pupils: Pupils are equal, round, and reactive to light.   Neck:      Thyroid: No thyromegaly.      Vascular: No JVD.      Trachea: No tracheal deviation.   Cardiovascular:      Rate and Rhythm: Normal rate and regular rhythm.      Heart sounds: Normal heart sounds. No murmur heard.  No friction rub. No gallop.    Pulmonary:      Effort: Pulmonary effort is normal. No respiratory distress.      Breath sounds: Normal breath sounds. No stridor. No wheezing or rales.   Chest:      Chest wall: No tenderness.   Abdominal:      General: Bowel sounds are normal. There is no distension.      Palpations: Abdomen is soft. There is no mass.      Tenderness: There is no abdominal tenderness. There is no guarding or rebound.      Hernia: No hernia is present.   Genitourinary:     Rectum: Guaiac result negative.   Musculoskeletal:      Cervical back: Normal range of motion and neck supple.   Lymphadenopathy:      Cervical: No " cervical adenopathy.   Skin:     General: Skin is warm and dry.      Coloration: Skin is not pale.      Findings: No erythema or rash.   Neurological:      Mental Status: He is alert and oriented to person, place, and time.      Cranial Nerves: No cranial nerve deficit.      Motor: No abnormal muscle tone.      Coordination: Coordination normal.      Deep Tendon Reflexes: Reflexes are normal and symmetric. Reflexes normal.   Psychiatric:         Behavior: Behavior normal.         Thought Content: Thought content normal.         Judgment: Judgment normal.         Assessment/Plan      Diagnosis Plan   1. Chronic hepatitis C without hepatic coma (HCC)  Comprehensive Metabolic Panel    Hepatitis C RNA, Quantitative, PCR (graph)     Liver enzymes and viral load will be checked today.  Mavyret will be refilled. The patient will complete Mavyret treatment and return in 4 weeks to have liver enzymes and viral load rechecked.           MARCELO Stark

## 2021-11-04 LAB
ALBUMIN SERPL-MCNC: 4.7 G/DL (ref 3.5–5.2)
ALBUMIN/GLOB SERPL: 1.7 G/DL
ALP SERPL-CCNC: 65 U/L (ref 39–117)
ALT SERPL W P-5'-P-CCNC: 33 U/L (ref 1–41)
ANION GAP SERPL CALCULATED.3IONS-SCNC: 9.6 MMOL/L (ref 5–15)
AST SERPL-CCNC: 30 U/L (ref 1–40)
BILIRUB SERPL-MCNC: 0.4 MG/DL (ref 0–1.2)
BUN SERPL-MCNC: 17 MG/DL (ref 6–20)
BUN/CREAT SERPL: 18.9 (ref 7–25)
CALCIUM SPEC-SCNC: 9.5 MG/DL (ref 8.6–10.5)
CHLORIDE SERPL-SCNC: 100 MMOL/L (ref 98–107)
CO2 SERPL-SCNC: 27.4 MMOL/L (ref 22–29)
CREAT SERPL-MCNC: 0.9 MG/DL (ref 0.76–1.27)
GFR SERPL CREATININE-BSD FRML MDRD: 100 ML/MIN/1.73
GLOBULIN UR ELPH-MCNC: 2.7 GM/DL
GLUCOSE SERPL-MCNC: 102 MG/DL (ref 65–99)
POTASSIUM SERPL-SCNC: 4.1 MMOL/L (ref 3.5–5.2)
PROT SERPL-MCNC: 7.4 G/DL (ref 6–8.5)
SODIUM SERPL-SCNC: 137 MMOL/L (ref 136–145)

## 2021-11-05 LAB
HCV RNA SERPL NAA+PROBE-ACNC: <15 IU/ML
TEST INFORMATION: NORMAL

## 2021-11-09 ENCOUNTER — TELEPHONE (OUTPATIENT)
Dept: GASTROENTEROLOGY | Facility: CLINIC | Age: 31
End: 2021-11-09

## 2021-11-09 DIAGNOSIS — B18.2 CHRONIC HEPATITIS C WITHOUT HEPATIC COMA (HCC): Primary | ICD-10-CM

## 2021-11-09 NOTE — TELEPHONE ENCOUNTER
Please notify patient that HCV level was down to <15.  Liver enzymes have returned to normal as well.  Stress the importance of completing the remaining medication as scheduled.  Have patient return in 4 weeks for end of treatment labs to check viral load and liver enzymes.

## 2021-12-10 ENCOUNTER — LAB (OUTPATIENT)
Dept: LAB | Facility: HOSPITAL | Age: 31
End: 2021-12-10

## 2021-12-10 DIAGNOSIS — B18.2 CHRONIC HEPATITIS C WITHOUT HEPATIC COMA (HCC): ICD-10-CM

## 2021-12-10 LAB
ALBUMIN SERPL-MCNC: 5 G/DL (ref 3.5–5.2)
ALBUMIN/GLOB SERPL: 1.8 G/DL
ALP SERPL-CCNC: 81 U/L (ref 39–117)
ALT SERPL W P-5'-P-CCNC: 45 U/L (ref 1–41)
ANION GAP SERPL CALCULATED.3IONS-SCNC: 7.5 MMOL/L (ref 5–15)
AST SERPL-CCNC: 55 U/L (ref 1–40)
BILIRUB SERPL-MCNC: 0.5 MG/DL (ref 0–1.2)
BUN SERPL-MCNC: 18 MG/DL (ref 6–20)
BUN/CREAT SERPL: 17.5 (ref 7–25)
CALCIUM SPEC-SCNC: 9.8 MG/DL (ref 8.6–10.5)
CHLORIDE SERPL-SCNC: 99 MMOL/L (ref 98–107)
CO2 SERPL-SCNC: 27.5 MMOL/L (ref 22–29)
CREAT SERPL-MCNC: 1.03 MG/DL (ref 0.76–1.27)
GFR SERPL CREATININE-BSD FRML MDRD: 85 ML/MIN/1.73
GLOBULIN UR ELPH-MCNC: 2.8 GM/DL
GLUCOSE SERPL-MCNC: 99 MG/DL (ref 65–99)
POTASSIUM SERPL-SCNC: 3.8 MMOL/L (ref 3.5–5.2)
PROT SERPL-MCNC: 7.8 G/DL (ref 6–8.5)
SODIUM SERPL-SCNC: 134 MMOL/L (ref 136–145)

## 2021-12-10 PROCEDURE — 36415 COLL VENOUS BLD VENIPUNCTURE: CPT

## 2021-12-10 PROCEDURE — 87522 HEPATITIS C REVRS TRNSCRPJ: CPT

## 2021-12-10 PROCEDURE — 80053 COMPREHEN METABOLIC PANEL: CPT

## 2021-12-13 ENCOUNTER — TELEPHONE (OUTPATIENT)
Dept: GASTROENTEROLOGY | Facility: CLINIC | Age: 31
End: 2021-12-13

## 2021-12-13 DIAGNOSIS — B18.2 CHRONIC HEPATITIS C WITHOUT HEPATIC COMA (HCC): Primary | ICD-10-CM

## 2021-12-13 LAB
HCV RNA SERPL NAA+PROBE-ACNC: NORMAL IU/ML
TEST INFORMATION: NORMAL

## 2021-12-13 NOTE — TELEPHONE ENCOUNTER
Please notify patient that HCV was not detected on recent labs.  Liver enzymes were normal as well.  He will need to have final labs in 12 weeks and a final visit with me the following week.

## 2022-03-04 ENCOUNTER — TRANSCRIBE ORDERS (OUTPATIENT)
Dept: ADMINISTRATIVE | Facility: HOSPITAL | Age: 32
End: 2022-03-04

## 2022-03-04 DIAGNOSIS — M54.50 LOW BACK PAIN, UNSPECIFIED BACK PAIN LATERALITY, UNSPECIFIED CHRONICITY, UNSPECIFIED WHETHER SCIATICA PRESENT: Primary | ICD-10-CM
